# Patient Record
Sex: MALE | Race: WHITE | NOT HISPANIC OR LATINO | Employment: UNEMPLOYED | URBAN - METROPOLITAN AREA
[De-identification: names, ages, dates, MRNs, and addresses within clinical notes are randomized per-mention and may not be internally consistent; named-entity substitution may affect disease eponyms.]

---

## 2019-12-02 ENCOUNTER — APPOINTMENT (OUTPATIENT)
Dept: RADIOLOGY | Facility: MEDICAL CENTER | Age: 69
End: 2019-12-02
Attending: EMERGENCY MEDICINE
Payer: COMMERCIAL

## 2019-12-02 ENCOUNTER — HOSPITAL ENCOUNTER (EMERGENCY)
Facility: MEDICAL CENTER | Age: 69
End: 2019-12-02
Attending: EMERGENCY MEDICINE
Payer: COMMERCIAL

## 2019-12-02 VITALS — HEART RATE: 160 BPM | DIASTOLIC BLOOD PRESSURE: 89 MMHG | SYSTOLIC BLOOD PRESSURE: 188 MMHG

## 2019-12-02 LAB
ALBUMIN SERPL BCP-MCNC: 3 G/DL (ref 3.2–4.9)
ALBUMIN/GLOB SERPL: 1.6 G/DL
ALP SERPL-CCNC: 518 U/L (ref 30–99)
ALT SERPL-CCNC: 123 U/L (ref 2–50)
ANION GAP SERPL CALC-SCNC: 30 MMOL/L (ref 0–11.9)
APTT PPP: 63.4 SEC (ref 24.7–36)
AST SERPL-CCNC: 133 U/L (ref 12–45)
BASOPHILS # BLD AUTO: 0.6 % (ref 0–1.8)
BASOPHILS # BLD: 0.02 K/UL (ref 0–0.12)
BILIRUB SERPL-MCNC: 6.3 MG/DL (ref 0.1–1.5)
BLOOD CULTURE HOLD CXBCH: NORMAL
BUN SERPL-MCNC: 29 MG/DL (ref 8–22)
CALCIUM SERPL-MCNC: 10.5 MG/DL (ref 8.5–10.5)
CHLORIDE SERPL-SCNC: 94 MMOL/L (ref 96–112)
CO2 SERPL-SCNC: 17 MMOL/L (ref 20–33)
CREAT SERPL-MCNC: 1.77 MG/DL (ref 0.5–1.4)
EKG IMPRESSION: NORMAL
EOSINOPHIL # BLD AUTO: 0.01 K/UL (ref 0–0.51)
EOSINOPHIL NFR BLD: 0.3 % (ref 0–6.9)
ERYTHROCYTE [DISTWIDTH] IN BLOOD BY AUTOMATED COUNT: 53.2 FL (ref 35.9–50)
GLOBULIN SER CALC-MCNC: 1.9 G/DL (ref 1.9–3.5)
GLUCOSE SERPL-MCNC: 109 MG/DL (ref 65–99)
HCT VFR BLD AUTO: 44.3 % (ref 42–52)
HGB BLD-MCNC: 13.8 G/DL (ref 14–18)
IMM GRANULOCYTES # BLD AUTO: 0.47 K/UL (ref 0–0.11)
IMM GRANULOCYTES NFR BLD AUTO: 13.7 % (ref 0–0.9)
INR PPP: 2.05 (ref 0.87–1.13)
LACTATE BLD-SCNC: 18.8 MMOL/L (ref 0.5–2)
LIPASE SERPL-CCNC: 9 U/L (ref 11–82)
LYMPHOCYTES # BLD AUTO: 0.95 K/UL (ref 1–4.8)
LYMPHOCYTES NFR BLD: 27.8 % (ref 22–41)
MAGNESIUM SERPL-MCNC: 2.1 MG/DL (ref 1.5–2.5)
MCH RBC QN AUTO: 31.7 PG (ref 27–33)
MCHC RBC AUTO-ENTMCNC: 31.2 G/DL (ref 33.7–35.3)
MCV RBC AUTO: 101.6 FL (ref 81.4–97.8)
MONOCYTES # BLD AUTO: 0.11 K/UL (ref 0–0.85)
MONOCYTES NFR BLD AUTO: 3.2 % (ref 0–13.4)
NEUTROPHILS # BLD AUTO: 1.86 K/UL (ref 1.82–7.42)
NEUTROPHILS NFR BLD: 54.4 % (ref 44–72)
NRBC # BLD AUTO: 0 K/UL
NRBC BLD-RTO: 0 /100 WBC
PLATELET # BLD AUTO: 41 K/UL (ref 164–446)
PMV BLD AUTO: 10.9 FL (ref 9–12.9)
POTASSIUM SERPL-SCNC: 2.8 MMOL/L (ref 3.6–5.5)
PROT SERPL-MCNC: 4.9 G/DL (ref 6–8.2)
PROTHROMBIN TIME: 23.8 SEC (ref 12–14.6)
RBC # BLD AUTO: 4.36 M/UL (ref 4.7–6.1)
SODIUM SERPL-SCNC: 141 MMOL/L (ref 135–145)
TROPONIN T SERPL-MCNC: 24 NG/L (ref 6–19)
WBC # BLD AUTO: 3.4 K/UL (ref 4.8–10.8)

## 2019-12-02 PROCEDURE — 700111 HCHG RX REV CODE 636 W/ 250 OVERRIDE (IP): Performed by: EMERGENCY MEDICINE

## 2019-12-02 PROCEDURE — 85025 COMPLETE CBC W/AUTO DIFF WBC: CPT

## 2019-12-02 PROCEDURE — 83690 ASSAY OF LIPASE: CPT

## 2019-12-02 PROCEDURE — 85730 THROMBOPLASTIN TIME PARTIAL: CPT

## 2019-12-02 PROCEDURE — 94770 HCHG CO2 EXPIRED GAS DETERMINATION: CPT

## 2019-12-02 PROCEDURE — 303105 HCHG CATHETER EXTRA

## 2019-12-02 PROCEDURE — 84484 ASSAY OF TROPONIN QUANT: CPT

## 2019-12-02 PROCEDURE — 80053 COMPREHEN METABOLIC PANEL: CPT

## 2019-12-02 PROCEDURE — 83735 ASSAY OF MAGNESIUM: CPT

## 2019-12-02 PROCEDURE — 96374 THER/PROPH/DIAG INJ IV PUSH: CPT

## 2019-12-02 PROCEDURE — 92950 HEART/LUNG RESUSCITATION CPR: CPT

## 2019-12-02 PROCEDURE — 85610 PROTHROMBIN TIME: CPT

## 2019-12-02 PROCEDURE — 99285 EMERGENCY DEPT VISIT HI MDM: CPT

## 2019-12-02 PROCEDURE — 700105 HCHG RX REV CODE 258: Performed by: EMERGENCY MEDICINE

## 2019-12-02 PROCEDURE — 700101 HCHG RX REV CODE 250: Performed by: EMERGENCY MEDICINE

## 2019-12-02 PROCEDURE — 36415 COLL VENOUS BLD VENIPUNCTURE: CPT

## 2019-12-02 PROCEDURE — 51702 INSERT TEMP BLADDER CATH: CPT

## 2019-12-02 PROCEDURE — 93005 ELECTROCARDIOGRAM TRACING: CPT | Performed by: EMERGENCY MEDICINE

## 2019-12-02 PROCEDURE — 83605 ASSAY OF LACTIC ACID: CPT

## 2019-12-02 PROCEDURE — 94002 VENT MGMT INPAT INIT DAY: CPT

## 2019-12-02 RX ORDER — CALCIUM CHLORIDE 100 MG/ML
INJECTION INTRAVENOUS; INTRAVENTRICULAR
Status: COMPLETED | OUTPATIENT
Start: 2019-12-02 | End: 2019-12-02

## 2019-12-02 RX ORDER — NALOXONE HYDROCHLORIDE 1 MG/ML
INJECTION INTRAMUSCULAR; INTRAVENOUS; SUBCUTANEOUS
Status: COMPLETED | OUTPATIENT
Start: 2019-12-02 | End: 2019-12-02

## 2019-12-02 RX ORDER — NOREPINEPHRINE BITARTRATE 1 MG/ML
INJECTION, SOLUTION INTRAVENOUS
Status: DISCONTINUED
Start: 2019-12-02 | End: 2019-12-02 | Stop reason: HOSPADM

## 2019-12-02 RX ADMIN — CALCIUM CHLORIDE 1 G: 100 INJECTION INTRAVENOUS; INTRAVENTRICULAR at 11:18

## 2019-12-02 RX ADMIN — NALOXONE HYDROCHLORIDE 2 MG: 1 INJECTION PARENTERAL at 11:32

## 2019-12-02 RX ADMIN — NOREPINEPHRINE BITARTRATE 5 MCG/KG/MIN: 1 INJECTION INTRAVENOUS at 11:42

## 2019-12-02 RX ADMIN — EPINEPHRINE 1 MG: 0.1 INJECTION, SOLUTION ENDOTRACHEAL; INTRACARDIAC; INTRAVENOUS at 11:31

## 2019-12-02 RX ADMIN — SODIUM BICARBONATE 50 MEQ: 84 INJECTION, SOLUTION INTRAVENOUS at 11:17

## 2019-12-02 RX ADMIN — EPINEPHRINE 1 MG: 0.1 INJECTION, SOLUTION ENDOTRACHEAL; INTRACARDIAC; INTRAVENOUS at 11:13

## 2019-12-02 NOTE — ED PROVIDER NOTES
ED Provider Note    CHIEF COMPLAINT  No chief complaint on file.      HPI  Lazarus Orozco is a 69 y.o. male who presents for evaluation of out of hospital cardiac arrest.  The patient was brought in by paramedics.  Apparently called 911 for not feeling well.  When they arrived he was still awake but became immediately obtunded.  They subsequently intubated him in the field without any paralytics or sedatives.  Chest compressions were initiated he was given 1 round of CPR and epinephrine in the field and arrives here pulseless.  We have no past medical history on the patient.  There is no family or relatives at his place of residence.  His blood sugar was 100 in the field.  Paramedic confirmed that the intubation was successful.  An IV was established.  Patient arrives here intubated and pulseless with CPR in progress    REVIEW OF SYSTEMS  See HPI for further details.  Unavailable unknown all other systems are negative.     PAST MEDICAL HISTORY  No past medical history on file.  Unknown  FAMILY HISTORY  Unknown    SOCIAL HISTORY  Social History     Socioeconomic History   • Marital status: Single     Spouse name: Not on file   • Number of children: Not on file   • Years of education: Not on file   • Highest education level: Not on file   Occupational History   • Not on file   Social Needs   • Financial resource strain: Not on file   • Food insecurity:     Worry: Not on file     Inability: Not on file   • Transportation needs:     Medical: Not on file     Non-medical: Not on file   Tobacco Use   • Smoking status: Not on file   Substance and Sexual Activity   • Alcohol use: Not on file   • Drug use: Not on file   • Sexual activity: Not on file   Lifestyle   • Physical activity:     Days per week: Not on file     Minutes per session: Not on file   • Stress: Not on file   Relationships   • Social connections:     Talks on phone: Not on file     Gets together: Not on file     Attends [employer] service: Not on file      Active member of club or organization: Not on file     Attends meetings of clubs or organizations: Not on file     Relationship status: Not on file   • Intimate partner violence:     Fear of current or ex partner: Not on file     Emotionally abused: Not on file     Physically abused: Not on file     Forced sexual activity: Not on file   Other Topics Concern   • Not on file   Social History Narrative   • Not on file     Unknown  SURGICAL HISTORY  No past surgical history on file.  Unknown  CURRENT MEDICATIONS  Home Medications    **Home medications have not yet been reviewed for this encounter**     Unknown    ALLERGIES  Allergies not on file    PHYSICAL EXAM  VITAL SIGNS: BP (!) 188/89   Pulse (!) 160   Pulseless CPR in progress    Constitutional: Elderly ill-appearing   hENT: Normocephalic, Atraumatic, Bilateral external ears normal, Oropharynx moist, No oral exudates, Nose normal.   Eyes: Pupils 5 mm fixed and dilated no corneal reflexes  Neck: Normal range of motion, No tenderness, Supple, No stridor.   Lymphatic: No lymphadenopathy noted.   Cardiovascular: CPR in progress  Thorax & Lungs: Patient is intubated bilateral breath sounds are equal  Abdomen: Bowel sounds normal, Soft, No tenderness, No masses, No pulsatile masses.   Skin: Pale cyanotic  Back: No tenderness, No CVA tenderness.   Extremities: No peripheral pulses track marks noted on the left forearm insistent with drug use  Neurologic: GCS 3 T    Results for orders placed or performed during the hospital encounter of 12/02/19   Prothrombin Time   Result Value Ref Range    PT 23.8 (H) 12.0 - 14.6 sec    INR 2.05 (H) 0.87 - 1.13   APTT   Result Value Ref Range    APTT 63.4 (H) 24.7 - 36.0 sec   LACTIC ACID   Result Value Ref Range    Lactic Acid 18.8 (HH) 0.5 - 2.0 mmol/L   EKG (NOW)   Result Value Ref Range    Report       [facility]    Test Date:  2019-12-02  Pt Name:    LING GARZA                  Department:  [facility]  MRN:        [id number]                      Room:        01  Gender:     Male                         Technician: [id number]  :        1950                   Requested By:ROLANDO KIMBLE  Order #:    [id number]                    Reading MD:    Measurements  Intervals                                Axis  Rate:       150                          P:          0  OR:         107                          QRS:        -81  QRSD:       96                           T:          73  QT:         236  QTc:        373    Interpretive Statements  SINUS TACHYCARDIA  MULTIFORM VENTRICULAR PREMATURE COMPLEXES  MARKEDLY POSTERIOR QRS AXIS  BORDERLINE LOW VOLTAGE IN FRONTAL LEADS  No previous ECG available for comparison          COURSE & MEDICAL DECISION MAKING  Pertinent Labs & Imaging studies reviewed. (See chart for details)  CPR was continued on arrival.  The patient had 4 rounds of CPR.  He received bicarbonate, IV calcium, and Narcan.  He had a brief return of spontaneous circulation with a vigorous pulse.  Bedside ultrasound demonstrated hyperdynamic cardiac activity without suggestion of tamponade.  The patient had 3 subsequent decompensations where he went pulseless again.  After approximately 25 minutes of aggressive resuscitative efforts the patient went pulseless again and it was determined with his profoundly elevated lactic acid, fixed and dilated pupils, no signs of purposeful movement or corneal reflexes that the patient was likely brain dead and we discontinued further resuscitative efforts.  Time of death was 1146    FINAL IMPRESSION  1.  Cardiopulmonary arrest  2.  Possible drug overdose      Electronically signed by: Rolando Kimble, 2019 11:53 AM

## 2019-12-02 NOTE — CODE DOCUMENTATION
Pt loss of pulses. ERP Kimble notified via telephone. Per ERP hold compressions at this time. ERP to bedside.

## 2019-12-02 NOTE — DISCHARGE PLANNING
Medical Social Work    Referral: Acute Medical Patient    Intervention: ROSAS responded to RED 01. Pt is a 69 year old male BIB [facility] from [facility] room #602. Pt's name is Lazarus Orozco ([birth year]). Per EMS, pt called 911 and reported he collapsed a few days ago. No family/friends on scene. Pt lost pulses on the way to the hospital and CPR in progress on arrival. Pulses regained and pt remains in critical condition. Per chart review, in 2015 pt had a girlfriend listed, Katie Jones ([phone]), as emergency contact. SW called and left a voicemail requested a call back.     ROSAS also called [facility] ([phone]) and asked the  if pt had any emergency contacts or family/friends listed and they said no emergency contacts listed.     Plan: ROSAS will remain available.